# Patient Record
Sex: MALE | Race: WHITE | NOT HISPANIC OR LATINO | Employment: FULL TIME | URBAN - METROPOLITAN AREA
[De-identification: names, ages, dates, MRNs, and addresses within clinical notes are randomized per-mention and may not be internally consistent; named-entity substitution may affect disease eponyms.]

---

## 2017-12-15 ENCOUNTER — HISTORICAL (OUTPATIENT)
Dept: SURGERY | Facility: HOSPITAL | Age: 52
End: 2017-12-15

## 2017-12-15 LAB
ABS NEUT (OLG): 2.9 X10(3)/MCL (ref 1.5–6.9)
ALBUMIN SERPL-MCNC: 3.9 GM/DL (ref 3.4–5)
ALBUMIN/GLOB SERPL: 1.1 RATIO
ALP SERPL-CCNC: 97 UNIT/L (ref 30–113)
ALT SERPL-CCNC: 52 UNIT/L (ref 10–45)
APTT PPP: 25.8 SECOND(S) (ref 25–35)
AST SERPL-CCNC: 28 UNIT/L (ref 15–37)
BILIRUB SERPL-MCNC: 0.7 MG/DL (ref 0.1–0.9)
BILIRUBIN DIRECT+TOT PNL SERPL-MCNC: 0.2 MG/DL (ref 0–0.3)
BILIRUBIN DIRECT+TOT PNL SERPL-MCNC: 0.5 MG/DL
BUN SERPL-MCNC: 11 MG/DL (ref 10–20)
CALCIUM SERPL-MCNC: 9 MG/DL (ref 8–10.5)
CHLORIDE SERPL-SCNC: 102 MMOL/L (ref 100–108)
CO2 SERPL-SCNC: 27 MMOL/L (ref 21–35)
CREAT SERPL-MCNC: 0.71 MG/DL (ref 0.7–1.3)
ERYTHROCYTE [DISTWIDTH] IN BLOOD BY AUTOMATED COUNT: 11.7 % (ref 11.5–17)
GLOBULIN SER-MCNC: 3.5 GM/DL
GLUCOSE SERPL-MCNC: 103 MG/DL (ref 75–116)
HCT VFR BLD AUTO: 43.6 % (ref 42–52)
HGB BLD-MCNC: 14.8 GM/DL (ref 14–18)
INR PPP: 1 (ref 0–1.2)
MCH RBC QN AUTO: 31 PG (ref 27–34)
MCHC RBC AUTO-ENTMCNC: 34 GM/DL (ref 31–36)
MCV RBC AUTO: 92 FL (ref 80–99)
PLATELET # BLD AUTO: 203 X10(3)/MCL (ref 140–400)
PMV BLD AUTO: 10 FL (ref 6.8–10)
POTASSIUM SERPL-SCNC: 4.4 MMOL/L (ref 3.6–5.2)
PROT SERPL-MCNC: 7.4 GM/DL (ref 6.4–8.2)
PROTHROMBIN TIME: 10.3 SECOND(S) (ref 9–12)
RBC # BLD AUTO: 4.76 X10(6)/MCL (ref 4.7–6.1)
SODIUM SERPL-SCNC: 139 MMOL/L (ref 135–145)
WBC # SPEC AUTO: 5.2 X10(3)/MCL (ref 4.5–11.5)

## 2017-12-21 ENCOUNTER — HISTORICAL (OUTPATIENT)
Dept: ANESTHESIOLOGY | Facility: HOSPITAL | Age: 52
End: 2017-12-21

## 2018-10-05 LAB
ABS NEUT (OLG): 2.2 X10(3)/MCL (ref 1.5–6.9)
ALBUMIN SERPL-MCNC: 3.7 GM/DL (ref 3.4–5)
ALBUMIN/GLOB SERPL: 1 RATIO
ALP SERPL-CCNC: 98 UNIT/L (ref 30–113)
ALT SERPL-CCNC: 75 UNIT/L (ref 10–45)
APTT PPP: 25.2 SECOND(S) (ref 25–35)
AST SERPL-CCNC: 42 UNIT/L (ref 15–37)
BILIRUB SERPL-MCNC: 0.3 MG/DL (ref 0.1–0.9)
BILIRUBIN DIRECT+TOT PNL SERPL-MCNC: 0.1 MG/DL (ref 0–0.3)
BILIRUBIN DIRECT+TOT PNL SERPL-MCNC: 0.2 MG/DL
BUN SERPL-MCNC: 11 MG/DL (ref 10–20)
CALCIUM SERPL-MCNC: 8.6 MG/DL (ref 8–10.5)
CHLORIDE SERPL-SCNC: 102 MMOL/L (ref 100–108)
CO2 SERPL-SCNC: 30 MMOL/L (ref 21–35)
CREAT SERPL-MCNC: 0.77 MG/DL (ref 0.7–1.3)
ERYTHROCYTE [DISTWIDTH] IN BLOOD BY AUTOMATED COUNT: 11.8 % (ref 11.5–17)
GLOBULIN SER-MCNC: 3.8 GM/DL
GLUCOSE SERPL-MCNC: 107 MG/DL (ref 75–116)
HCT VFR BLD AUTO: 45.4 % (ref 42–52)
HGB BLD-MCNC: 15.1 GM/DL (ref 14–18)
INR PPP: 1 (ref 0–1.2)
MCH RBC QN AUTO: 31 PG (ref 27–34)
MCHC RBC AUTO-ENTMCNC: 33 GM/DL (ref 31–36)
MCV RBC AUTO: 94 FL (ref 80–99)
PLATELET # BLD AUTO: 213 X10(3)/MCL (ref 140–400)
PMV BLD AUTO: 10 FL (ref 6.8–10)
POTASSIUM SERPL-SCNC: 4.7 MMOL/L (ref 3.6–5.2)
PROT SERPL-MCNC: 7.5 GM/DL (ref 6.4–8.2)
PROTHROMBIN TIME: 10.3 SECOND(S) (ref 9–12)
RBC # BLD AUTO: 4.82 X10(6)/MCL (ref 4.7–6.1)
SODIUM SERPL-SCNC: 139 MMOL/L (ref 135–145)
WBC # SPEC AUTO: 5.1 X10(3)/MCL (ref 4.5–11.5)

## 2018-10-11 ENCOUNTER — HISTORICAL (OUTPATIENT)
Dept: ANESTHESIOLOGY | Facility: HOSPITAL | Age: 53
End: 2018-10-11

## 2020-06-30 ENCOUNTER — HISTORICAL (OUTPATIENT)
Dept: RADIOLOGY | Facility: HOSPITAL | Age: 55
End: 2020-06-30

## 2021-07-08 ENCOUNTER — IMMUNIZATION (OUTPATIENT)
Dept: PRIMARY CARE CLINIC | Facility: CLINIC | Age: 56
End: 2021-07-08

## 2021-07-08 DIAGNOSIS — Z23 NEED FOR VACCINATION: Primary | ICD-10-CM

## 2021-11-16 ENCOUNTER — HISTORICAL (OUTPATIENT)
Dept: SURGERY | Facility: HOSPITAL | Age: 56
End: 2021-11-16

## 2021-11-16 LAB
ABS NEUT (OLG): 1.8 X10(3)/MCL (ref 1.5–6.9)
ALBUMIN SERPL-MCNC: 4.2 GM/DL (ref 3.5–5)
ALBUMIN/GLOB SERPL: 1.4 RATIO (ref 1.1–2)
ALP SERPL-CCNC: 83 UNIT/L (ref 40–150)
ALT SERPL-CCNC: 40 UNIT/L (ref 0–55)
APTT PPP: 26.1 SEC (ref 23.4–34.9)
AST SERPL-CCNC: 32 UNIT/L (ref 5–34)
BILIRUB SERPL-MCNC: 0.4 MG/DL
BILIRUBIN DIRECT+TOT PNL SERPL-MCNC: 0.2 MG/DL (ref 0–0.5)
BILIRUBIN DIRECT+TOT PNL SERPL-MCNC: 0.2 MG/DL (ref 0–0.8)
BUN SERPL-MCNC: 11 MG/DL (ref 8.4–25.7)
CALCIUM SERPL-MCNC: 9.7 MG/DL (ref 8.7–10.5)
CHLORIDE SERPL-SCNC: 99 MMOL/L (ref 98–107)
CO2 SERPL-SCNC: 30 MMOL/L (ref 22–29)
CREAT SERPL-MCNC: 0.74 MG/DL (ref 0.73–1.18)
ERYTHROCYTE [DISTWIDTH] IN BLOOD BY AUTOMATED COUNT: 11.7 % (ref 11.5–17)
GLOBULIN SER-MCNC: 3.1 GM/DL (ref 2.4–3.5)
GLUCOSE SERPL-MCNC: 98 MG/DL (ref 74–100)
HCT VFR BLD AUTO: 44.2 % (ref 42–52)
HGB BLD-MCNC: 15.1 GM/DL (ref 14–18)
INR PPP: 1 (ref 2–3)
MCH RBC QN AUTO: 31 PG (ref 27–34)
MCHC RBC AUTO-ENTMCNC: 34 GM/DL (ref 31–36)
MCV RBC AUTO: 92 FL (ref 80–99)
PLATELET # BLD AUTO: 218 X10(3)/MCL (ref 140–400)
PMV BLD AUTO: 10.1 FL (ref 6.8–10)
POTASSIUM SERPL-SCNC: 4.7 MMOL/L (ref 3.5–5.1)
PROT SERPL-MCNC: 7.3 GM/DL (ref 6.4–8.3)
PROTHROMBIN TIME: 12.6 SEC (ref 11.7–14.5)
RBC # BLD AUTO: 4.82 X10(6)/MCL (ref 4.7–6.1)
SODIUM SERPL-SCNC: 137 MMOL/L (ref 136–145)
WBC # SPEC AUTO: 4.3 X10(3)/MCL (ref 4.5–11.5)

## 2021-11-18 ENCOUNTER — HISTORICAL (OUTPATIENT)
Dept: ANESTHESIOLOGY | Facility: HOSPITAL | Age: 56
End: 2021-11-18

## 2022-04-30 NOTE — OP NOTE
PREOPERATIVE DIAGNOSIS:  Screening colonoscopy.    POSTOPERATIVE DIAGNOSIS:    1. 1.5 cm pedunculated polyp of the distal descending at 50 cm.  Shala ink tattooing and hot snare removal.  2. Scant diverticular disease in sigmoid colon.    INDICATION FOR PROCEDURE:  A 52-year-old white male with screening colonoscopy.  No alarming symptoms.    CONSENT:  The patient was consented for the procedure at my office.  The risks and benefits of the procedure were explained to him in detail.  He is willing to undergo those risks.    PROCEDURE IN DETAIL:  He has brought down to the endoscopy suite and laid in left lateral decubitus position.  Rectal exam was performed, nontender, no external abnormalities.  Prostate was smooth, trilobar without any inflow enlargement.     The Olympus colonoscope was advanced all the way to the cecum.  The cecum was visualized and photographed.  Terminal ileum intubated to 10 cm.  No abnormalities in the terminal ileum.  The cecum was also visualized.  No masses, no polyps, and no mucosal changes.  The same applies to the hepatic flexure, ascending colon, hepatic flexure, transverse colon, and splenic flexure.     In the descending colon at approximately 50 cm, there was a sizable 1.5 cm pedunculated polyp that was Shala ink tattooed and removed on electrocautery setting with a hot snare of 8.  It was retrieved and brought out with the scope.  It had to be brought out through the rectum.  The scope was then used to be reinserted.  I went back and examined the area.  No perforation, no bleed.  There was a good stalk visualized with no obvious abnormal tissue at its new apex.  The scope was pulled back.  The rest of the colon was examined.     The sigmoid had a few scant diverticular disease.  No perforation. no bleed.  The rectum on retroflexion showed no abnormalities.    SUMMARY:  A 52-year-old white male with a 1.5 cm pedunculated polyp of the descending colon and left-sided  diverticulosis status post removal of the polyp, hot snare technique.     We will follow up on the pathology.  It is slightly concerning, but again, I would base my colonoscopy based off the pathology, and I will likely go back in a year pending that for clear margins.  If it is a tubulovillous, we will go back sooner .     Thank you for allowing me to participate in the care of your patient.        MICHELLE/TEODORA   DD: 12/21/2017 1033   DT: 12/21/2017 1115  Job # [id number]    cc: JEANA Simons III, M.D.

## 2022-04-30 NOTE — OP NOTE
DATE OF SURGERY:    11/18/2021    SURGEON:  Ronan Rashid III, M.D.    PREOPERATIVE DIAGNOSIS:  History of colon polyps.    POSTOPERATIVE DIAGNOSES:    1. Descending colon polyp, subcentimeter, hot biopsy piecemeal removal.  2. Left-sided diverticular disease.    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  I went back a year later, did not find any signs of such recurrence and we are here at the 3 year interval to perform surveillance.     The patient was consented for the procedure prior to.  The risks and benefits of the procedure were explained to him in detail.  He was willing to undergo the risks.  Ish Griffin CRNA, present.  Please see his documentation for medications administered.    PROCEDURE IN DETAIL:  The patient was brought down to the endoscopy suite.  Laid in left lateral decubitus position.  Rectal exam was performed, it was within normal limits.  Prostate within normal limits.     The Olympus colonoscope was then advanced all the way to the cecum with some mild difficulty and the terminal ileum was intubated to about 10 cm.  There were no abnormalities noted to the terminal ileum.  360-degree circumferential views were taken of the entirety of the colon.  Withdrawal time was approximately 15 minutes.  There were no abnormalities noted to the cecum, ascending colon, hepatic flexure, transverse colon, and splenic flexure.  In the mid proximal descending there was approximately a 5 mm polyp that was hot biopsied piecemeal removed with fulguration.  No perforation.  No bleed.  Electrocautery settings of 12 and 6.     I took 2 surveillances of that left side of the colon.  He had some diverticular disease on that side and I did not see any signs of recurrence of any tubulovillous nature appearing polyps that we saw back in 2017.     Of note, on my way into the colonoscopy, I  perceived what may have been a very early sessile serrated adenomatous type polyp, but I took 2 or 3 surveillances in the descending sigmoid junction and I could not see the lesion.  Again, it was small enough to perhaps visualize on the way in, but not on the way out after many passes with the scope.  The rest of the sigmoid was within normal limits with some scant diverticular disease in the rectum and retroflexion was normal.    IN SUMMARY:  This is a 56-year-old white male with a solitary subcentimeter hot biopsied, fulgurated polypectomy on the descending colon and some left scant diverticular disease with a history of tubulovillous polyp from 2017.     We will follow up on the pathology of the biopsy taken today and we will recommend repeat colonoscopy in 5 years due to ACG recommendations for tubulovillous polyps.       Thank you for allowing me to participate in the care of this patient.        MICHELLE/TEODORA   DD: 11/18/2021 0913   DT: 11/18/2021 1006  Job # [id number]    cc: JEANA Simons III, M.D.

## 2022-04-30 NOTE — OP NOTE
PREOPERATIVE DIAGNOSIS:  History of tubulovillous polyp.    POSTOPERATIVE DIAGNOSIS:  No signs of recurrence, scant left-sided diverticular disease, and suboptimal prep.    INDICATION:  A 53-year-old white male presented 6 months ago with a routine colonoscopy screening and found a tubulovillous polyp 1.5 cm at 50 cm in the distal descending colon.  He is here for followup to ensure that I snared it properly and there were no signs of residual polyp or tissue.   He was consented for the procedure prior to.  Risks, benefits and the procedure were explained to him in detail.  He is willing to undergo those risks.    ANESTHESIA:  Of note, the patient was given Fentanyl and Propofol per anesthesia per CRNAs.  Please see the records for anesthesia.    PROCEDURE IN DETAIL:  The patient was brought down to the endoscopy room suite, and laid in the left lateral decubitus position.  Rectal exam was performed.  It was within normal limits.  Prostate normal.     The scope was then lubricated, advanced all the way to the cecum.  The cecum was visualized and photographed.  The ileocecal orifice was also within normal limits.  Cecum normal.  The right side of the colon was evaluated with 360 degree circumferential views.  He had a suboptimal prep, so I used the pressure jet to try to clear up as much of that as possible.  I did not see any masses or polyps on the right side.  Same applies to the hepatic flexure, transverse colon, splenic flexure, and descending colon.  He had some scant diverticular disease less than 3 mm in the descending colon.     At the distal descending at approximately 50 cm from the anus, I took careful 360 degree circumferential views.  I also pressure washed twice.  I came all the way down to the rectum, did not see where the tattoo site was from 6 to 8 months ago.  I saw no signs of recurrence of any polyps in the region.  Again, I reintroduced all the way back up to about 70 cm and came back  down to the 2nd look.  Again, did not see any masses or polyps that had recurred or that were residualized from the last endoscopy.  So the distal descending, sigmoid and rectum were free of any lesions.  The rectum in retroflexion showed no abnormalities as well.     In summary, a 53-year-old white male with a tubulovillous polyp with no signs of recurrence.    RECOMMENDATIONS:  Repeat colonoscopy in 3 years.       Thank you for the consultation, __________, and for allowing me to participate in the care of your patient.        MICHELLE/TEODORA   DD: 10/11/2018 1205   DT: 10/11/2018 1230  Job # [id number]    cc:  __________   Ronan Rashid III, M.D.

## 2022-08-24 DIAGNOSIS — Z87.891 PERSONAL HISTORY OF TOBACCO USE, PRESENTING HAZARDS TO HEALTH: Primary | ICD-10-CM

## 2022-10-17 ENCOUNTER — HOSPITAL ENCOUNTER (OUTPATIENT)
Dept: RADIOLOGY | Facility: HOSPITAL | Age: 57
Discharge: HOME | End: 2022-10-17
Attending: FAMILY MEDICINE
Payer: COMMERCIAL

## 2022-10-17 DIAGNOSIS — Z87.891 PERSONAL HISTORY OF TOBACCO USE, PRESENTING HAZARDS TO HEALTH: ICD-10-CM

## 2022-10-17 PROCEDURE — 76706 US ABDL AORTA SCREEN AAA: CPT | Mod: TC

## 2023-10-31 DIAGNOSIS — Z87.891 HISTORY OF TOBACCO USE: Primary | ICD-10-CM

## 2023-11-13 ENCOUNTER — HOSPITAL ENCOUNTER (OUTPATIENT)
Dept: RADIOLOGY | Facility: HOSPITAL | Age: 58
Discharge: HOME | End: 2023-11-13
Attending: FAMILY MEDICINE
Payer: COMMERCIAL

## 2023-11-13 DIAGNOSIS — Z87.891 HISTORY OF TOBACCO USE: ICD-10-CM

## 2023-11-13 PROCEDURE — 71271 CT THORAX LUNG CANCER SCR C-: CPT | Mod: TC

## 2024-11-06 DIAGNOSIS — Z87.891 PERSONAL HISTORY OF TOBACCO USE, PRESENTING HAZARDS TO HEALTH: Primary | ICD-10-CM

## 2025-01-15 ENCOUNTER — ANESTHESIA EVENT (OUTPATIENT)
Dept: SURGERY | Facility: HOSPITAL | Age: 60
End: 2025-01-15
Payer: COMMERCIAL

## 2025-01-15 ENCOUNTER — CLINICAL SUPPORT (OUTPATIENT)
Dept: RESPIRATORY THERAPY | Facility: HOSPITAL | Age: 60
End: 2025-01-15
Attending: INTERNAL MEDICINE
Payer: COMMERCIAL

## 2025-01-15 DIAGNOSIS — Z86.0100 HX OF COLONIC POLYPS: ICD-10-CM

## 2025-01-15 DIAGNOSIS — Z86.0100 HX OF COLONIC POLYPS: Primary | ICD-10-CM

## 2025-01-15 LAB
OHS QRS DURATION: 94 MS
OHS QTC CALCULATION: 463 MS

## 2025-01-15 PROCEDURE — 93005 ELECTROCARDIOGRAM TRACING: CPT

## 2025-01-15 PROCEDURE — 93010 ELECTROCARDIOGRAM REPORT: CPT | Mod: ,,, | Performed by: STUDENT IN AN ORGANIZED HEALTH CARE EDUCATION/TRAINING PROGRAM

## 2025-01-15 RX ORDER — MV-MN/FOLATE 11/M.THISTLE/HERB 72.3MCGDFE
1 CAPSULE ORAL EVERY MORNING
COMMUNITY

## 2025-01-15 RX ORDER — MULTIVITAMIN WITH IRON
1 TABLET ORAL EVERY MORNING
COMMUNITY

## 2025-01-15 RX ORDER — IBUPROFEN 100 MG/5ML
1000 SUSPENSION, ORAL (FINAL DOSE FORM) ORAL EVERY MORNING
COMMUNITY

## 2025-01-15 RX ORDER — SIMVASTATIN 20 MG/1
20 TABLET, FILM COATED ORAL NIGHTLY
COMMUNITY
Start: 2024-12-17

## 2025-01-15 RX ORDER — MULTIVITAMIN
1 TABLET ORAL EVERY MORNING
COMMUNITY

## 2025-01-15 RX ORDER — FELODIPINE 10 MG/1
10 TABLET, EXTENDED RELEASE ORAL EVERY MORNING
COMMUNITY
Start: 2020-01-01

## 2025-01-15 RX ORDER — ASPIRIN 81 MG/1
81 TABLET ORAL EVERY MORNING
COMMUNITY

## 2025-01-15 NOTE — DISCHARGE INSTRUCTIONS
Follow prep on Wednesday. Clear liquids only. Nothing by mouth after midnight. Stop Aspirin. Take Felodipine AM of procedure with small sip of water.         INSTRUCTIONS  AFTER A COLONOSCOPY/EGD    NO DRIVING X 24 HOURS. NOTIFY YOUR DOCTOR WITH     ABDOMINAL PAIN UNRELIEVED BY  PASSING GAS,   FEVER WITHIN 24 HOURS, OR LARGE AMOUNT OF BLEEDING.

## 2025-01-15 NOTE — ANESTHESIA PREPROCEDURE EVALUATION
01/15/2025  Cipriano Nelson is a 59 y.o., male.      Pre-op Assessment    I have reviewed the Patient Summary Reports.     I have reviewed the Nursing Notes. I have reviewed the NPO Status.   I have reviewed the Medications.     Review of Systems  Anesthesia Hx:             Denies Family Hx of Anesthesia complications.    Denies Personal Hx of Anesthesia complications.                    Social:  Smoker, Alcohol Use       Hematology/Oncology:  Hematology Normal   Oncology Normal                                   EENT/Dental:  EENT/Dental Normal           Cardiovascular:  Cardiovascular Normal                  ECG has been reviewed.                            Pulmonary:  Pulmonary Normal                       Renal/:  Renal/ Normal                 Hepatic/GI:  Hepatic/GI Normal                    Musculoskeletal:  Musculoskeletal Normal                Neurological:  Neurology Normal                                      Endocrine:  Endocrine Normal            Dermatological:  Skin Normal    Psych:  Psychiatric Normal                    Physical Exam  General: Cooperative, Alert and Oriented    Airway:  Mallampati: II   Mouth Opening: Normal  TM Distance: Normal  Tongue: Normal  Neck ROM: Normal ROM    Dental:  Intact        Anesthesia Plan  Type of Anesthesia, risks & benefits discussed:    Anesthesia Type: Gen Natural Airway  Intra-op Monitoring Plan: Standard ASA Monitors  Post Op Pain Control Plan:   (medical reason for not using multimodal pain management)  Induction:  IV  Informed Consent: Informed consent signed with the Patient and all parties understand the risks and agree with anesthesia plan.  All questions answered. Patient consented to blood products? Yes  ASA Score: 2    Ready For Surgery From Anesthesia Perspective.     .

## 2025-01-16 ENCOUNTER — ANESTHESIA (OUTPATIENT)
Dept: SURGERY | Facility: HOSPITAL | Age: 60
End: 2025-01-16
Payer: COMMERCIAL

## 2025-01-16 ENCOUNTER — HOSPITAL ENCOUNTER (OUTPATIENT)
Facility: HOSPITAL | Age: 60
Discharge: HOME | End: 2025-01-16
Attending: INTERNAL MEDICINE | Admitting: INTERNAL MEDICINE
Payer: COMMERCIAL

## 2025-01-16 VITALS
WEIGHT: 229.94 LBS | BODY MASS INDEX: 34.06 KG/M2 | OXYGEN SATURATION: 96 % | TEMPERATURE: 98 F | HEART RATE: 90 BPM | DIASTOLIC BLOOD PRESSURE: 90 MMHG | HEIGHT: 69 IN | RESPIRATION RATE: 18 BRPM | SYSTOLIC BLOOD PRESSURE: 129 MMHG

## 2025-01-16 DIAGNOSIS — Z86.0100 HX OF COLONIC POLYPS: ICD-10-CM

## 2025-01-16 PROCEDURE — 37000008 HC ANESTHESIA 1ST 15 MINUTES: Performed by: INTERNAL MEDICINE

## 2025-01-16 PROCEDURE — 45385 COLONOSCOPY W/LESION REMOVAL: CPT | Mod: 33 | Performed by: INTERNAL MEDICINE

## 2025-01-16 PROCEDURE — D9220A PRA ANESTHESIA: Mod: ,,, | Performed by: NURSE ANESTHETIST, CERTIFIED REGISTERED

## 2025-01-16 PROCEDURE — 37000009 HC ANESTHESIA EA ADD 15 MINS: Performed by: INTERNAL MEDICINE

## 2025-01-16 PROCEDURE — 27201423 OPTIME MED/SURG SUP & DEVICES STERILE SUPPLY: Performed by: INTERNAL MEDICINE

## 2025-01-16 PROCEDURE — 63600175 PHARM REV CODE 636 W HCPCS: Performed by: NURSE ANESTHETIST, CERTIFIED REGISTERED

## 2025-01-16 RX ORDER — LIDOCAINE HYDROCHLORIDE 20 MG/ML
INJECTION INTRAVENOUS
Status: DISCONTINUED | OUTPATIENT
Start: 2025-01-16 | End: 2025-01-16

## 2025-01-16 RX ORDER — FENTANYL CITRATE 50 UG/ML
INJECTION, SOLUTION INTRAMUSCULAR; INTRAVENOUS
Status: DISCONTINUED | OUTPATIENT
Start: 2025-01-16 | End: 2025-01-16

## 2025-01-16 RX ORDER — MIDAZOLAM HYDROCHLORIDE 1 MG/ML
INJECTION INTRAMUSCULAR; INTRAVENOUS
Status: DISCONTINUED | OUTPATIENT
Start: 2025-01-16 | End: 2025-01-16

## 2025-01-16 RX ORDER — PROPOFOL 10 MG/ML
VIAL (ML) INTRAVENOUS
Status: DISCONTINUED | OUTPATIENT
Start: 2025-01-16 | End: 2025-01-16

## 2025-01-16 RX ADMIN — LIDOCAINE HYDROCHLORIDE 60 MG: 20 INJECTION, SOLUTION INTRAVENOUS at 08:01

## 2025-01-16 RX ADMIN — PROPOFOL 50 MG: 10 INJECTION, EMULSION INTRAVENOUS at 08:01

## 2025-01-16 RX ADMIN — MIDAZOLAM 2 MG: 1 INJECTION INTRAMUSCULAR; INTRAVENOUS at 08:01

## 2025-01-16 RX ADMIN — FENTANYL CITRATE 100 MCG: 50 INJECTION, SOLUTION INTRAMUSCULAR; INTRAVENOUS at 08:01

## 2025-01-16 RX ADMIN — PROPOFOL 100 MG: 10 INJECTION, EMULSION INTRAVENOUS at 08:01

## 2025-01-16 NOTE — ANESTHESIA POSTPROCEDURE EVALUATION
Anesthesia Post Evaluation    Patient: Cipriano Nelson    Procedure(s) Performed: Procedure(s) (LRB):  COLONOSCOPY (N/A)  COLONOSCOPY, WITH POLYPECTOMY USING SNARE (N/A)    Final Anesthesia Type: general      Patient participation: Yes- Able to Participate  Level of consciousness: awake and alert  Post-procedure vital signs: reviewed and stable  Pain management: adequate  Airway patency: patent    PONV status at discharge: No PONV  Anesthetic complications: no      Cardiovascular status: blood pressure returned to baseline  Respiratory status: unassisted  Hydration status: euvolemic  Follow-up not needed.              Vitals Value Taken Time   /83 01/16/25 0701   Temp 36.7 °C (98 °F) 01/16/25 0701   Pulse 94 01/16/25 0701   Resp 18 01/16/25 0701   SpO2 95 % 01/16/25 0701         No case tracking events are documented in the log.      Pain/Jaylen Score: No data recorded

## 2025-01-16 NOTE — OP NOTE
[facility] - Periop Services  Operative Note    Date of Procedure: 1/16/2025     Procedure: Procedure(s) (LRB):  COLONOSCOPY (N/A)  COLONOSCOPY, WITH POLYPECTOMY USING SNARE (N/A)   Colonoscopy with cold snare and saline elevation    Surgeons and Role:     * Ronan Rashid III, MD - Primary    Assisting Surgeon: None    Pre-Operative Diagnosis: Hx of colonic polyps [Z86.0100]  History of tubulovillous polyps    Post-Operative Diagnosis: Post-Op Diagnosis Codes:     * Hx of colonic polyps [Z86.0100]  Proximal sigmoid subcentimeter hex agonal planar saline elevated cold snare polypectomy at 30 cm  Mid sigmoid hex agonal cold snare polypectomy    Endoscopic Specimens:  ID Type Source Tests Collected by Time Destination   A : POLYPECTOMY PROXIMAL SIGMOID AT 30CM Tissue Large intestine, Sigmoid SPECIMEN TO PATHOLOGY Ronan Rashid III, MD 1/16/2025 0825    B : POLYPECTOMY Tissue Large intestine, Sigmoid SPECIMEN TO PATHOLOGY Ronan Rashid III, MD 1/16/2025 0834          Anesthesia: General    Consent:  Patient was consented for the procedure at my office.  The risks and benefits of procedure explained in detail.  They were willing to undergo those risks.    HPI & Operative Findings (including complications, if any):  [age]-year-old white male with a history of tubulovillous adenoma removed by myself in 2017.  He had appropriate surveillance at 2018 2021.  No alarm features he is here for surveillance.  Average colorectal cancer risk.      He was brought down to the endoscopy room suite.  Edwin Centeno CRNA.  Please see his documentation for medications administered.      Patient is placed in left lateral decubitus position rectal exam was performed was within normal limits.  Prostate normal.  Next para the Olympus colonoscope was then lubricated advanced with relative ease all the way to cecum cecum was visualized and photographed prep was adequate.  The terminal ileum was intubated up to 10 cm there  were no abnormalities noted.      The scope was pulled back the circumferential views were taken 360 of the entire colon.  The ascending colon hepatic flexure transverse colon splenic flexure descending were within normal limits.  He had some mild diverticular disease in the sigmoid.  However at the proximal sigmoid around 30 cm he had a flap planar 5 mm polyp that was saline elevated with a about 5 cc of sterile saline and removed with a cold hex agonal snare method.  No perforation no bleed was captured in jar letter A.  Similarly, there was an abnormality noted to the distal sigmoid.  A small polyp that was about 2-3 mm.  It was removed cold snare fashion.    Rectum on retroflexion was normal.  Patient tolerated the procedure well without any complication    Discussion:    Would recommend repeating colonoscopy in 5 years pending pathology is benign secondary to patient's tubulovillous adenoma history.      Blood Loss (EBL): 0 mL           Implants: * No implants in log *    Specimens:   Specimen (24h ago, onward)       Start     Ordered    01/16/25 6215  Specimen to Pathology  RELEASE UPON ORDERING        References:    Click here for ordering Quick Tip   Question:  Release to patient  Answer:  Immediate    01/16/25 1190                            Condition: Stable for Discharge    Disposition: Home or Self Care        Discharge Note    OUTCOME: Patient tolerated treatment/procedure well without complication and is now ready for discharge.    DISPOSITION: Home or Self Care    FINAL DIAGNOSIS:  <principal problem not specified>    FOLLOWUP: Follow up in clinic in 1-2 weeks to review biopsies    DISCHARGE INSTRUCTIONS:  No discharge procedures on file.

## 2025-01-20 LAB — PSYCHE PATHOLOGY RESULT: NORMAL

## 2025-01-27 ENCOUNTER — HOSPITAL ENCOUNTER (OUTPATIENT)
Dept: RADIOLOGY | Facility: HOSPITAL | Age: 60
Discharge: HOME | End: 2025-01-27
Attending: FAMILY MEDICINE
Payer: COMMERCIAL

## 2025-01-27 DIAGNOSIS — Z87.891 PERSONAL HISTORY OF TOBACCO USE, PRESENTING HAZARDS TO HEALTH: ICD-10-CM

## 2025-01-27 PROCEDURE — 71271 CT THORAX LUNG CANCER SCR C-: CPT | Mod: TC

## (undated) DEVICE — SNARE SNAREMASTER PLUS 15MM

## (undated) DEVICE — TRAP SUCTION POLYP

## (undated) DEVICE — SYR SALINE FLSH PRFL STRL 10ML

## (undated) DEVICE — NDL INTERJECT CATH 25G 200CM

## (undated) DEVICE — KIT SURGICAL COLON .25 1.1OZ